# Patient Record
Sex: MALE | Race: WHITE | NOT HISPANIC OR LATINO | Employment: UNEMPLOYED | URBAN - METROPOLITAN AREA
[De-identification: names, ages, dates, MRNs, and addresses within clinical notes are randomized per-mention and may not be internally consistent; named-entity substitution may affect disease eponyms.]

---
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
## 2024-04-24 ENCOUNTER — OFFICE VISIT (OUTPATIENT)
Dept: INTERNAL MEDICINE | Age: 19
End: 2024-04-24
Payer: COMMERCIAL

## 2024-04-24 ENCOUNTER — TELEPHONE (OUTPATIENT)
Dept: INTERNAL MEDICINE | Age: 19
End: 2024-04-24

## 2024-04-24 VITALS
DIASTOLIC BLOOD PRESSURE: 90 MMHG | WEIGHT: 176.6 LBS | BODY MASS INDEX: 26.76 KG/M2 | HEIGHT: 68 IN | OXYGEN SATURATION: 98 % | HEART RATE: 58 BPM | SYSTOLIC BLOOD PRESSURE: 148 MMHG | TEMPERATURE: 98.4 F | RESPIRATION RATE: 16 BRPM

## 2024-04-24 DIAGNOSIS — Z01.89 ROUTINE LAB DRAW: ICD-10-CM

## 2024-04-24 DIAGNOSIS — Z13.6 ENCOUNTER FOR SCREENING FOR CARDIOVASCULAR DISORDERS: ICD-10-CM

## 2024-04-24 DIAGNOSIS — I10 PRIMARY HYPERTENSION: ICD-10-CM

## 2024-04-24 DIAGNOSIS — G44.52 NEW DAILY PERSISTENT HEADACHE: ICD-10-CM

## 2024-04-24 DIAGNOSIS — G44.53 THUNDERCLAP HEADACHE: Primary | ICD-10-CM

## 2024-04-24 PROBLEM — G44.209 ACUTE NON INTRACTABLE TENSION-TYPE HEADACHE: Status: ACTIVE | Noted: 2024-04-24

## 2024-04-24 PROBLEM — G44.209 ACUTE NON INTRACTABLE TENSION-TYPE HEADACHE: Status: RESOLVED | Noted: 2024-04-24 | Resolved: 2024-04-24

## 2024-04-24 LAB
25(OH)D3 SERPL-MCNC: 27.1 NG/ML (ref 30–100)
ALBUMIN SERPL-MCNC: 5.1 G/DL (ref 3.5–5.2)
ALBUMIN/GLOB SERPL: 1.9 G/DL
ALP SERPL-CCNC: 125 U/L (ref 39–117)
ALT SERPL W P-5'-P-CCNC: 23 U/L (ref 1–41)
ANION GAP SERPL CALCULATED.3IONS-SCNC: 12.4 MMOL/L (ref 5–15)
AST SERPL-CCNC: 27 U/L (ref 1–40)
BASOPHILS # BLD AUTO: 0.04 10*3/MM3 (ref 0–0.2)
BASOPHILS NFR BLD AUTO: 0.6 % (ref 0–1.5)
BILIRUB SERPL-MCNC: 0.5 MG/DL (ref 0–1.2)
BUN SERPL-MCNC: 15 MG/DL (ref 6–20)
BUN/CREAT SERPL: 13 (ref 7–25)
CALCIUM SPEC-SCNC: 10.3 MG/DL (ref 8.6–10.5)
CHLORIDE SERPL-SCNC: 101 MMOL/L (ref 98–107)
CHOLEST SERPL-MCNC: 156 MG/DL (ref 0–200)
CO2 SERPL-SCNC: 26.6 MMOL/L (ref 22–29)
CREAT SERPL-MCNC: 1.15 MG/DL (ref 0.76–1.27)
DEPRECATED RDW RBC AUTO: 38.8 FL (ref 37–54)
EGFRCR SERPLBLD CKD-EPI 2021: 94 ML/MIN/1.73
EOSINOPHIL # BLD AUTO: 0.09 10*3/MM3 (ref 0–0.4)
EOSINOPHIL NFR BLD AUTO: 1.3 % (ref 0.3–6.2)
ERYTHROCYTE [DISTWIDTH] IN BLOOD BY AUTOMATED COUNT: 13 % (ref 12.3–15.4)
FOLATE SERPL-MCNC: 19.3 NG/ML (ref 4.78–24.2)
GLOBULIN UR ELPH-MCNC: 2.7 GM/DL
GLUCOSE SERPL-MCNC: 100 MG/DL (ref 65–99)
HCT VFR BLD AUTO: 44.5 % (ref 37.5–51)
HDLC SERPL-MCNC: 50 MG/DL (ref 40–60)
HGB BLD-MCNC: 14.9 G/DL (ref 13–17.7)
IMM GRANULOCYTES # BLD AUTO: 0.01 10*3/MM3 (ref 0–0.05)
IMM GRANULOCYTES NFR BLD AUTO: 0.1 % (ref 0–0.5)
LDLC SERPL CALC-MCNC: 95 MG/DL (ref 0–100)
LDLC/HDLC SERPL: 1.9 {RATIO}
LYMPHOCYTES # BLD AUTO: 3.74 10*3/MM3 (ref 0.7–3.1)
LYMPHOCYTES NFR BLD AUTO: 52.5 % (ref 19.6–45.3)
MAGNESIUM SERPL-MCNC: 2.2 MG/DL (ref 1.7–2.2)
MCH RBC QN AUTO: 27.7 PG (ref 26.6–33)
MCHC RBC AUTO-ENTMCNC: 33.5 G/DL (ref 31.5–35.7)
MCV RBC AUTO: 82.9 FL (ref 79–97)
MONOCYTES # BLD AUTO: 0.45 10*3/MM3 (ref 0.1–0.9)
MONOCYTES NFR BLD AUTO: 6.3 % (ref 5–12)
NEUTROPHILS NFR BLD AUTO: 2.8 10*3/MM3 (ref 1.7–7)
NEUTROPHILS NFR BLD AUTO: 39.2 % (ref 42.7–76)
NRBC BLD AUTO-RTO: 0 /100 WBC (ref 0–0.2)
PLATELET # BLD AUTO: 271 10*3/MM3 (ref 140–450)
PMV BLD AUTO: 11 FL (ref 6–12)
POTASSIUM SERPL-SCNC: 3.8 MMOL/L (ref 3.5–5.2)
PROT SERPL-MCNC: 7.8 G/DL (ref 6–8.5)
RBC # BLD AUTO: 5.37 10*6/MM3 (ref 4.14–5.8)
SODIUM SERPL-SCNC: 140 MMOL/L (ref 136–145)
T3FREE SERPL-MCNC: 4.63 PG/ML (ref 2–4.4)
T4 FREE SERPL-MCNC: 1.67 NG/DL (ref 0.93–1.7)
TRIGL SERPL-MCNC: 55 MG/DL (ref 0–150)
TSH SERPL DL<=0.05 MIU/L-ACNC: 6.14 UIU/ML (ref 0.27–4.2)
VIT B12 BLD-MCNC: 686 PG/ML (ref 211–946)
VLDLC SERPL-MCNC: 11 MG/DL (ref 5–40)
WBC NRBC COR # BLD AUTO: 7.13 10*3/MM3 (ref 3.4–10.8)

## 2024-04-24 PROCEDURE — 82607 VITAMIN B-12: CPT | Performed by: INTERNAL MEDICINE

## 2024-04-24 PROCEDURE — 99204 OFFICE O/P NEW MOD 45 MIN: CPT | Performed by: INTERNAL MEDICINE

## 2024-04-24 PROCEDURE — 83735 ASSAY OF MAGNESIUM: CPT | Performed by: INTERNAL MEDICINE

## 2024-04-24 PROCEDURE — 82306 VITAMIN D 25 HYDROXY: CPT | Performed by: INTERNAL MEDICINE

## 2024-04-24 PROCEDURE — 80061 LIPID PANEL: CPT | Performed by: INTERNAL MEDICINE

## 2024-04-24 PROCEDURE — 82746 ASSAY OF FOLIC ACID SERUM: CPT | Performed by: INTERNAL MEDICINE

## 2024-04-24 PROCEDURE — 84439 ASSAY OF FREE THYROXINE: CPT | Performed by: INTERNAL MEDICINE

## 2024-04-24 PROCEDURE — 84481 FREE ASSAY (FT-3): CPT | Performed by: INTERNAL MEDICINE

## 2024-04-24 PROCEDURE — 80050 GENERAL HEALTH PANEL: CPT | Performed by: INTERNAL MEDICINE

## 2024-04-24 RX ORDER — AMLODIPINE BESYLATE 2.5 MG/1
2.5 TABLET ORAL DAILY
Qty: 30 TABLET | Refills: 2 | Status: SHIPPED | OUTPATIENT
Start: 2024-04-24

## 2024-04-24 RX ORDER — IBUPROFEN 600 MG/1
600 TABLET ORAL EVERY 8 HOURS PRN
Qty: 360 TABLET | Refills: 0 | Status: SHIPPED | OUTPATIENT
Start: 2024-04-24

## 2024-04-24 NOTE — ASSESSMENT & PLAN NOTE
Headache uncontrolled and new-now daily at frontal and temporal area for the past week-initially was at the left frontal area and occurred when he was lifting weights- 3 times a week for the past 2 weeks and out daily at the frontal and temporal area  May be associated with the amount of weights he was lifting he started lifting 600 pounds for the past 4 weeks and was doing only firing 50 pounds prior to the    Need to rule out possible aneurysm or other pathology    Plan-feel imaging studies needed we will do a CT scan of the head  Advised patient to cut back on the amount of weights he is lifting less than 5 pounds preferably and if headache recurs then stop  Use ibuprofen can take up to 600 mg with food as needed for headaches counseled on side effects of chronic use specially bleeding and gastritis  Follow-up as scheduled-sooner if symptoms worsen like blurred vision worsening headache-elevated blood pressure    Will order CT of the head with contrast and CT angiogram of the head with contrast    Addendum April 24, 2024  Initially ordered CT of the head without contrast which got approved and tried to change this to CT of head WITH contrast and also get a CTA of the head WITH contrast but insurance currently is denying this.

## 2024-04-24 NOTE — ASSESSMENT & PLAN NOTE
Blood pressure uncontrolled-BP at clinic equals 148/90 on my exam-146/90 in January 2024 at ER, and in October 2021 BP equals 138/71.    Assessment-hypertension uncontrolled    Plan-discussed hypertension and its complications i.e. stroke MI chronic kidney disease peripheral vascular disease  Discussed lifestyle changes patient is a non-smoker-and exercises regularly but diet is poor.  He was eating out 1-2 times per week at fast foods last month states has decreased that recently.  Start amlodipine 2.5 mg 1 daily  Goal blood pressure is less than 130/80 ideally is 120/80  Encourage patient to check blood pressure at home or at clinic twice a day 3-4 times a week and record  Do labs today  Follow-up in 3 to 4 weeks

## 2024-04-24 NOTE — PATIENT INSTRUCTIONS
Do CT of head with contrast and CTA of head with contrast for Thunderclap headache  Labs today  Start amlodipine 2.5 mg daily-  Ibuprofen 600 mg with food for HA prn-SE discussed -bleeding , gastritis    Avoid any weight lifting or exercise until CT scan of the head and CTA of the head have been done

## 2024-04-24 NOTE — PROGRESS NOTES
CHIEF COMPLAINT  Adrian Harris presents to [facility] to Establish Care (Headaches-Started about two weeks ago at the gym when he was lifting weights and then started getting them everyday. Always on the left side of his head and goes back to his neck. No blurred vision. Some nausea. No light sensitivity. )     HPI    18-year-old patient here to establish care-    Main concern is per above he was lifting weights 2 weeks ago-and started getting headache on the left side--gets HA 1st 10 min when in gym-last episode of left frontal area HA was 3 days ago -now has had daily HA at bilateral temple area -last one was last night  lasted 30 min-3/10 then -now none at present  Not assd with loud noises or bright light-   Works at [employer] at night 5:45 pm to 4:15 am- sleeps for 8-9 hrs    2 weeks ago Was doing leg presses at 600 lbs-and HA started -start first 10 min after in gym ([employer]) going 3 days a week(WThFr)  HA lasts from 20 min-to an hour  Started  bench lifting 600 lbs 4 weeks ago (550 lbs prior)       Elev BP-- 138/71  on 10/2021-eats out 1-2x/week last month-    FH-denies CAD in parents though dad had renal failure at 19 yo-pt unclear as to dx    SH- no cig- no drugs or ETOH  Works at [employer] at night 5:45 pm to 4:15 am- sleeps for 8-9 hrs   Works out W,Th and Fri one hr     Past History:  Allergies: Patient has no known allergies.   Medical History: has no past medical history on file.   Surgical History: has a past surgical history that includes Tonsillectomy.   Family History: family history is not on file.   Social History: reports that he has never smoked. He has never been exposed to tobacco smoke. He has never used smokeless tobacco. He reports that he does not drink alcohol.  Social History     Social History Narrative    Not on file         Current Outpatient Medications:     amLODIPine (NORVASC) 2.5 MG tablet, Take 1 tablet by mouth Daily. Goal BP<130/80, Disp: 30  "tablet, Rfl: 2    ibuprofen (ADVIL,MOTRIN) 600 MG tablet, Take 1 tablet by mouth Every 8 (Eight) Hours As Needed for Mild Pain. With food, Disp: 360 tablet, Rfl: 0     OBJECTIVE  Vital Signs  Vitals:    04/24/24 0758 04/24/24 0831   BP: 150/90 148/90   BP Location: Left arm Right arm   Patient Position: Sitting Sitting   Cuff Size: Large Adult Adult   Pulse: 58    Resp: 16    Temp: 98.4 °F (36.9 °C)    TempSrc: Temporal    SpO2: 98%    Weight: 80.1 kg (176 lb 9.6 oz)    Height: 172.7 cm (68\")       Body mass index is 26.85 kg/m².      Physical Exam  Vitals and nursing note reviewed.   Constitutional:       Appearance: Normal appearance.   HENT:      Head: Normocephalic and atraumatic.      Right Ear: Tympanic membrane normal.      Left Ear: Tympanic membrane normal.      Nose: Nose normal. No rhinorrhea.      Mouth/Throat:      Pharynx: No posterior oropharyngeal erythema.      Comments: No sinus tenderness  Eyes:      Extraocular Movements: Extraocular movements intact.      Pupils: Pupils are equal, round, and reactive to light.   Cardiovascular:      Rate and Rhythm: Normal rate and regular rhythm.   Pulmonary:      Effort: Pulmonary effort is normal.      Breath sounds: Normal breath sounds.   Abdominal:      General: Abdomen is flat.      Palpations: Abdomen is soft.   Musculoskeletal:      Cervical back: Normal range of motion and neck supple.   Skin:     General: Skin is warm and dry.   Neurological:      General: No focal deficit present.      Mental Status: He is alert and oriented to person, place, and time.   Psychiatric:         Mood and Affect: Mood normal.         Behavior: Behavior normal.         RESULTS REVIEW  No results found for: \"PROBNP\", \"BNP\"  CMP          4/24/2024    09:11   CMP   Glucose 100    BUN 15    Creatinine 1.15    EGFR 94.0    Sodium 140    Potassium 3.8    Chloride 101    Calcium 10.3    Total Protein 7.8    Albumin 5.1    Globulin 2.7    Total Bilirubin 0.5    Alkaline " Phosphatase 125    AST (SGOT) 27    ALT (SGPT) 23    Albumin/Globulin Ratio 1.9    BUN/Creatinine Ratio 13.0    Anion Gap 12.4         Lipid Panel          4/24/2024    09:11   Lipid Panel   Total Cholesterol 156    Triglycerides 55    HDL Cholesterol 50    VLDL Cholesterol 11    LDL Cholesterol  95    LDL/HDL Ratio 1.90       Lab Results   Component Value Date    TSH 6.140 (H) 04/24/2024      Lab Results   Component Value Date    FREET4 1.67 04/24/2024            No Images in the past 120 days found..              ASSESSMENT & PLAN  Diagnoses and all orders for this visit:    1. Thunderclap headache (Primary)  Assessment & Plan:    Headache uncontrolled and new-now daily at frontal and temporal area for the past week-initially was at the left frontal area and occurred when he was lifting weights- 3 times a week for the past 2 weeks and out daily at the frontal and temporal area  May be associated with the amount of weights he was lifting he started lifting 600 pounds for the past 4 weeks and was doing only firing 50 pounds prior to the    Need to rule out possible aneurysm or other pathology    Plan-feel imaging studies needed we will do a CT scan of the head  Advised patient to cut back on the amount of weights he is lifting less than 5 pounds preferably and if headache recurs then stop  Use ibuprofen can take up to 600 mg with food as needed for headaches counseled on side effects of chronic use specially bleeding and gastritis  Follow-up as scheduled-sooner if symptoms worsen like blurred vision worsening headache-elevated blood pressure    Will order CT of the head with contrast and CT angiogram of the head with contrast    Addendum April 24, 2024  Initially ordered CT of the head without contrast which got approved and tried to change this to CT of head WITH contrast and also get a CTA of the head WITH contrast but insurance currently is denying this.                        Orders:  -     CT Angiogram Head  With Contrast; Future  -     CT Head With Contrast; Future    2. New daily persistent headache  -     Cancel: CT Head Without Contrast; Future  -     ibuprofen (ADVIL,MOTRIN) 600 MG tablet; Take 1 tablet by mouth Every 8 (Eight) Hours As Needed for Mild Pain. With food  Dispense: 360 tablet; Refill: 0  -     CT Angiogram Head With Contrast; Future  -     CT Head With Contrast; Future    3. Primary hypertension  Assessment & Plan:  Blood pressure uncontrolled-BP at clinic equals 148/90 on my exam-146/90 in January 2024 at ER, and in October 2021 BP equals 138/71.    Assessment-hypertension uncontrolled    Plan-discussed hypertension and its complications i.e. stroke MI chronic kidney disease peripheral vascular disease  Discussed lifestyle changes patient is a non-smoker-and exercises regularly but diet is poor.  He was eating out 1-2 times per week at fast foods last month states has decreased that recently.  Start amlodipine 2.5 mg 1 daily  Goal blood pressure is less than 130/80 ideally is 120/80  Encourage patient to check blood pressure at home or at clinic twice a day 3-4 times a week and record  Do labs today  Follow-up in 3 to 4 weeks    Orders:  -     amLODIPine (NORVASC) 2.5 MG tablet; Take 1 tablet by mouth Daily. Goal BP<130/80  Dispense: 30 tablet; Refill: 2  -     Comprehensive Metabolic Panel; Future  -     Lipid Panel; Future  -     TSH+Free T4; Future  -     T3, Free; Future  -     Magnesium; Future  -     Vitamin D,25-Hydroxy; Future  -     CBC & Differential; Future  -     MicroAlbumin, Urine, Random - Urine, Clean Catch; Future  -     Vitamin B12; Future  -     Folate; Future  -     Cancel: CT Head Without Contrast; Future  -     ibuprofen (ADVIL,MOTRIN) 600 MG tablet; Take 1 tablet by mouth Every 8 (Eight) Hours As Needed for Mild Pain. With food  Dispense: 360 tablet; Refill: 0  -     Folate  -     Vitamin B12  -     CBC & Differential  -     Vitamin D,25-Hydroxy  -     Magnesium  -     T3,  Free  -     TSH+Free T4  -     Lipid Panel  -     Comprehensive Metabolic Panel    4. Encounter for screening for cardiovascular disorders  -     Comprehensive Metabolic Panel; Future  -     Lipid Panel; Future  -     TSH+Free T4; Future  -     T3, Free; Future  -     Magnesium; Future  -     Vitamin D,25-Hydroxy; Future  -     CBC & Differential; Future  -     MicroAlbumin, Urine, Random - Urine, Clean Catch; Future  -     Vitamin B12; Future  -     Folate; Future  -     Folate  -     Vitamin B12  -     CBC & Differential  -     Vitamin D,25-Hydroxy  -     Magnesium  -     T3, Free  -     TSH+Free T4  -     Lipid Panel  -     Comprehensive Metabolic Panel    5. Routine lab draw  -     Comprehensive Metabolic Panel; Future  -     Lipid Panel; Future  -     TSH+Free T4; Future  -     T3, Free; Future  -     Magnesium; Future  -     Vitamin D,25-Hydroxy; Future  -     CBC & Differential; Future  -     MicroAlbumin, Urine, Random - Urine, Clean Catch; Future  -     Vitamin B12; Future  -     Folate; Future  -     Folate  -     Vitamin B12  -     CBC & Differential  -     Vitamin D,25-Hydroxy  -     Magnesium  -     T3, Free  -     TSH+Free T4  -     Lipid Panel  -     Comprehensive Metabolic Panel         Pediatric BMI = 84 %ile (Z= 1.01) based on CDC (Boys, 2-20 Years) BMI-for-age based on BMI available as of 4/24/2024.. BMI is >= 25 and <30. (Overweight) The following options were offered after discussion;: weight loss educational material (shared in after visit summary), exercise counseling/recommendations, and nutrition counseling/recommendations,       Patient Instructions   Do CT of head with contrast and CTA of head with contrast for Thunderclap headache  Labs today  Start amlodipine 2.5 mg daily-  Ibuprofen 600 mg with food for HA prn-SE discussed -bleeding , gastritis    Avoid any weight lifting or exercise until CT scan of the head and CTA of the head have been done     FOLLOW UP  Return in about 27 days  (around 5/21/2024) for Recheck, Annual physical.    Patient was given instructions and counseling regarding his condition or for health maintenance advice. Please see specific information pulled into the AVS if appropriate.

## 2024-04-30 ENCOUNTER — PATIENT ROUNDING (BHMG ONLY) (OUTPATIENT)
Dept: INTERNAL MEDICINE | Age: 19
End: 2024-04-30
Payer: COMMERCIAL

## 2024-05-09 ENCOUNTER — OFFICE VISIT (OUTPATIENT)
Dept: INTERNAL MEDICINE | Age: 19
End: 2024-05-09
Payer: COMMERCIAL

## 2024-05-09 ENCOUNTER — HOSPITAL ENCOUNTER (OUTPATIENT)
Dept: MRI IMAGING | Facility: HOSPITAL | Age: 19
Discharge: HOME | End: 2024-05-09
Admitting: NURSE PRACTITIONER
Payer: COMMERCIAL

## 2024-05-09 VITALS
HEART RATE: 86 BPM | RESPIRATION RATE: 18 BRPM | DIASTOLIC BLOOD PRESSURE: 82 MMHG | HEIGHT: 68 IN | OXYGEN SATURATION: 98 % | SYSTOLIC BLOOD PRESSURE: 136 MMHG | TEMPERATURE: 98.2 F | WEIGHT: 170.6 LBS | BODY MASS INDEX: 25.85 KG/M2

## 2024-05-09 DIAGNOSIS — R93.89 ABNORMAL COMPUTED TOMOGRAPHY ANGIOGRAPHY (CTA): ICD-10-CM

## 2024-05-09 DIAGNOSIS — G44.52 NEW DAILY PERSISTENT HEADACHE: ICD-10-CM

## 2024-05-09 DIAGNOSIS — G44.52 NEW DAILY PERSISTENT HEADACHE: Primary | ICD-10-CM

## 2024-05-09 DIAGNOSIS — R90.89 ABNORMAL BRAIN MRI: ICD-10-CM

## 2024-05-09 PROCEDURE — 70551 MRI BRAIN STEM W/O DYE: CPT

## 2024-05-09 PROCEDURE — 99213 OFFICE O/P EST LOW 20 MIN: CPT | Performed by: NURSE PRACTITIONER

## 2024-05-10 ENCOUNTER — TELEPHONE (OUTPATIENT)
Dept: INTERNAL MEDICINE | Age: 19
End: 2024-05-10
Payer: COMMERCIAL

## 2024-05-16 ENCOUNTER — OFFICE VISIT (OUTPATIENT)
Dept: INTERNAL MEDICINE | Age: 19
End: 2024-05-16
Payer: COMMERCIAL

## 2024-05-16 VITALS
WEIGHT: 174.6 LBS | SYSTOLIC BLOOD PRESSURE: 136 MMHG | BODY MASS INDEX: 26.46 KG/M2 | DIASTOLIC BLOOD PRESSURE: 80 MMHG | TEMPERATURE: 98.7 F | HEIGHT: 68 IN | OXYGEN SATURATION: 98 % | RESPIRATION RATE: 18 BRPM | HEART RATE: 84 BPM

## 2024-05-16 DIAGNOSIS — G44.52 NEW DAILY PERSISTENT HEADACHE: ICD-10-CM

## 2024-05-16 DIAGNOSIS — R90.89 ABNORMAL BRAIN MRI: Primary | ICD-10-CM

## 2024-05-16 PROCEDURE — 99212 OFFICE O/P EST SF 10 MIN: CPT | Performed by: NURSE PRACTITIONER

## 2024-05-16 NOTE — PROGRESS NOTES
"Chief Complaint  Headache (18 year old male here today for a follow up on headaches. States he has still been having headaches daily. /He had an MRI to follow up on today as well. )    Subjective      Adrian Harris is a 18 year old male that presents to [facility] for f/u after MRI. Headaches are still persistent. He is not taking any medications. States that headaches are always located on the left side and typically go away pretty quick. He denies any associated symptoms. He has been off of work since last week. Has not been lifting any weights as instructed.  Scheduled to see neurosurgery tomorrow.           History of Present Illness    Current Outpatient Medications   Medication Instructions    amLODIPine (NORVASC) 2.5 mg, Oral, Daily, Goal BP<130/80    ibuprofen (ADVIL,MOTRIN) 600 mg, Oral, Every 8 Hours PRN, With food       The following portions of the patient's history were reviewed and updated as appropriate: allergies, current medications, past family history, past medical history, past social history, past surgical history, and problem list.    Objective   Vital Signs:   /80 (BP Location: Left arm, Patient Position: Sitting)   Pulse 84   Temp 98.7 °F (37.1 °C) (Temporal)   Resp 18   Ht 172.7 cm (68\")   Wt 79.2 kg (174 lb 9.6 oz)   SpO2 98%   BMI 26.55 kg/m²     Wt Readings from Last 3 Encounters:   05/16/24 79.2 kg (174 lb 9.6 oz) (75%, Z= 0.68)*   05/09/24 77.4 kg (170 lb 9.6 oz) (71%, Z= 0.55)*   04/24/24 80.1 kg (176 lb 9.6 oz) (77%, Z= 0.75)*     * Growth percentiles are based on CDC (Boys, 2-20 Years) data.     BP Readings from Last 3 Encounters:   05/16/24 136/80   05/09/24 136/82   04/24/24 148/90     Physical Exam  Vitals and nursing note reviewed.   Constitutional:       Appearance: Normal appearance.   HENT:      Head: Normocephalic and atraumatic.   Pulmonary:      Effort: Pulmonary effort is normal.   Skin:     General: Skin is warm and " dry.   Neurological:      Mental Status: He is alert and oriented to person, place, and time.   Psychiatric:         Mood and Affect: Mood normal.         Behavior: Behavior normal.          Result Review :  The following data was reviewed by: NIDHI Ryan on 05/16/2024:      Common labs          4/24/2024    09:11   Common Labs   Glucose 100    BUN 15    Creatinine 1.15    Sodium 140    Potassium 3.8    Chloride 101    Calcium 10.3    Albumin 5.1    Total Bilirubin 0.5    Alkaline Phosphatase 125    AST (SGOT) 27    ALT (SGPT) 23    WBC 7.13    Hemoglobin 14.9    Hematocrit 44.5    Platelets 271    Total Cholesterol 156    Triglycerides 55    HDL Cholesterol 50    LDL Cholesterol  95        Lab Results (last 72 hours)       ** No results found for the last 72 hours. **             MRI Brain Without Contrast    Result Date: 5/9/2024  Impression: 1.  0.8 cm rounded extracts focus of T1 high signal in the lateral left temporal region. Query the possibility of a small partially thrombosed cortical vein. Start number 2.  No acute infarction    Electronically Signed By-Kyle Gomez MD On:5/9/2024 7:25 PM        Lab Results   Component Value Date    SARSANTIGEN Not Detected 01/28/2024    COVID19 Not Detected 10/30/2021    RAPFLUA Negative 10/30/2021    RAPFLUB Negative 10/30/2021    FLUAAG Not Detected 01/28/2024    FLUBAG Not Detected 01/28/2024    RAPSCRN Negative 01/28/2024       Procedures        Assessment and Plan   Diagnoses and all orders for this visit:    1. Abnormal brain MRI (Primary)    2. New daily persistent headache          Neurosurgery appt tomorrow 5/17/24. Work restrictions paperwork completed.      There are no discontinued medications.       Follow Up   No follow-ups on file.  Patient was given instructions and counseling regarding his condition or for health maintenance advice. Please see specific information pulled into the AVS if appropriate.       Tabitha Monique  NIDHI  05/17/24  08:13 EDT

## 2024-05-20 ENCOUNTER — TELEPHONE (OUTPATIENT)
Dept: INTERNAL MEDICINE | Age: 19
End: 2024-05-20
Payer: COMMERCIAL

## 2024-05-20 NOTE — TELEPHONE ENCOUNTER
Caller: Adrian Harris    Relationship: Self    Best call back number: [phone]       How would you like to receive the form or medical records (pick-up, mail, fax[facility]      Timeframe paperwork needed: ASAP     Additional notes:     THE PATIENT SAID THAT DR TOURE SAID HE CAN GO BACK TO WORK IF HE FEELS COMFORTABLE TO DO SO. THE PATIENT SAID HE TO DR TOURE THAT HE WANTED TO BE OFF WORK BUT HAS  CHANGED HIS MIND DUE TO MONEY ISSUES. HE IS WANTING  TO KNOW IF PCP NAT WOULD WRITE THE NOTE FOR HIM TO RETURN TO WORK      PLEASE ADVISE PATIENT

## 2024-07-24 ENCOUNTER — OFFICE VISIT (OUTPATIENT)
Dept: INTERNAL MEDICINE | Age: 19
End: 2024-07-24
Payer: COMMERCIAL

## 2024-07-24 VITALS
BODY MASS INDEX: 25.52 KG/M2 | WEIGHT: 168.4 LBS | TEMPERATURE: 96.4 F | HEIGHT: 68 IN | OXYGEN SATURATION: 98 % | DIASTOLIC BLOOD PRESSURE: 80 MMHG | HEART RATE: 67 BPM | SYSTOLIC BLOOD PRESSURE: 140 MMHG

## 2024-07-24 DIAGNOSIS — I10 PRIMARY HYPERTENSION: ICD-10-CM

## 2024-07-24 DIAGNOSIS — G44.229 CHRONIC TENSION-TYPE HEADACHE, NOT INTRACTABLE: ICD-10-CM

## 2024-07-24 DIAGNOSIS — Z00.00 ROUTINE HISTORY AND PHYSICAL EXAMINATION OF ADULT: Primary | ICD-10-CM

## 2024-07-24 DIAGNOSIS — Q28.2 AVM (ARTERIOVENOUS MALFORMATION) BRAIN: ICD-10-CM

## 2024-07-24 PROCEDURE — 99395 PREV VISIT EST AGE 18-39: CPT | Performed by: INTERNAL MEDICINE

## 2024-07-24 RX ORDER — AMLODIPINE BESYLATE 2.5 MG/1
2.5 TABLET ORAL DAILY
Qty: 90 TABLET | Refills: 1 | Status: SHIPPED | OUTPATIENT
Start: 2024-07-24

## 2024-07-24 NOTE — PATIENT INSTRUCTIONS
Stay on amlodipine 2.5 mg daily  Goal BP <130/80  Follow low salt diet   Keep appointments with neurosurgeon tomorrow and neurologist Dr. Gaines  Follow-up in 6 weeks and bring blood pressure log

## 2024-07-24 NOTE — ASSESSMENT & PLAN NOTE
Blood pressure = 122/72 at clinic, on repeat exam it was 140/80  Assessment-hypertension uncontrolled    Plan-asked patient to monitor blood pressure at home  Goal blood pressures less than 130/80  Follow a low-salt diet  Continue amlodipine 2.5 mg daily

## 2024-07-24 NOTE — ASSESSMENT & PLAN NOTE
Ages 19 to 39 Counseling/Anticipatory Guidance Discussed: nutrition, physical activity, healthy weight, injury prevention, misuse of tobacco, alcohol and drugs, sexual behavior and STDs, dental health, mental health, immunizations, and use of seatbelt

## 2024-07-24 NOTE — PROGRESS NOTES
CHIEF COMPLAINT  Adrian Harris presents to [facility] for follow-up of Annual Exam (Patient has been seeing Dr. Paiz  Headaches have been a couple times a week patient has angioram tomorrow).    HPI    19-year-old patient here for physical    Chronic tension-type headache -referred to Dr. Gaines (8/2024)  by  Dr. Paiz at [facility]    Possible AV malformation seeing Dr. Paiz-MRI scan with potential catheter angiogram in the near future    Doing Mixed martial arts past month    C/o pressure type HA now every other day now-min to hours-    HTN-130/82-at home    Older notes    Patient Instructions 4/24/2024   Do CT of head with contrast and CTA of head with contrast for [place] headache  Labs today  Start amlodipine 2.5 mg daily-  Ibuprofen 600 mg with food for HA prn-SE discussed -bleeding , gastritis     Avoid any weight lifting or exercise until CT scan of the head and CTA of the head have been done   4/24/2024     patient here to establish care-     Main concern is per above he was lifting weights 2 weeks ago-and started getting headache on the left side--gets HA 1st 10 min when in gym-last episode of left frontal area HA was 3 days ago -now has had daily HA at bilateral temple area -last one was last night  lasted 30 min-3/10 then -now none at present  Not assd with loud noises or bright light-   Works at [employer] at night 5:45 pm to 4:15 am- sleeps for 8-9 hrs     2 weeks ago Was doing leg presses at 600 lbs-and HA started -start first 10 min after in gym ([employer]) going 3 days a week(WThFr)  HA lasts from 20 min-to an hour  Started  bench lifting 600 lbs 4 weeks ago (550 lbs prior)         Elev BP-- 138/71  on 10/2021-eats out 1-2x/week last month-     FH-denies CAD in parents though dad had renal failure at 19 yo-pt unclear as to dx     SH- no cig- no drugs or ETOH  Works at [employer] at night 5:45 pm to 4:15 am- sleeps for 8-9 hrs   Works out W,Th and Fri one hr        Current  "Outpatient Medications:     amLODIPine (NORVASC) 2.5 MG tablet, Take 1 tablet by mouth Daily. Goal BP<130/80, Disp: 90 tablet, Rfl: 1    ibuprofen (ADVIL,MOTRIN) 600 MG tablet, Take 1 tablet by mouth Every 8 (Eight) Hours As Needed for Mild Pain. With food, Disp: 360 tablet, Rfl: 0   PFSH reviewed.      OBJECTIVE  Vital Signs  Vitals:    07/24/24 1454 07/24/24 1612   BP: 122/72 140/80   BP Location: Left arm Left arm   Patient Position: Sitting Sitting   Cuff Size: Small Adult Adult   Pulse: 67    Temp: 96.4 °F (35.8 °C)    TempSrc: Temporal    SpO2: 98%    Weight: 76.4 kg (168 lb 6.4 oz)    Height: 172.7 cm (68\")       Body mass index is 25.61 kg/m².    Physical Exam  Vitals and nursing note reviewed.   Constitutional:       Appearance: Normal appearance.   HENT:      Head: Normocephalic and atraumatic.      Nose: Nose normal.   Eyes:      Extraocular Movements: Extraocular movements intact.      Pupils: Pupils are equal, round, and reactive to light.   Cardiovascular:      Rate and Rhythm: Normal rate and regular rhythm.   Pulmonary:      Effort: Pulmonary effort is normal.      Breath sounds: Normal breath sounds.   Abdominal:      General: Abdomen is flat.      Palpations: Abdomen is soft.   Musculoskeletal:      Cervical back: Normal range of motion and neck supple.   Skin:     General: Skin is warm and dry.   Neurological:      General: No focal deficit present.      Mental Status: He is alert and oriented to person, place, and time.   Psychiatric:         Mood and Affect: Mood normal.         Behavior: Behavior normal.          RESULTS REVIEW  No results found for: \"PROBNP\", \"BNP\"  CMP          4/24/2024    09:11   CMP   Glucose 100    BUN 15    Creatinine 1.15    EGFR 94.0    Sodium 140    Potassium 3.8    Chloride 101    Calcium 10.3    Total Protein 7.8    Albumin 5.1    Globulin 2.7    Total Bilirubin 0.5    Alkaline Phosphatase 125    AST (SGOT) 27    ALT (SGPT) 23    Albumin/Globulin Ratio 1.9  "   BUN/Creatinine Ratio 13.0    Anion Gap 12.4      CBC w/diff          4/24/2024    09:11 7/10/2024    15:10   CBC w/Diff   WBC 7.13  5.29       RBC 5.37  5.24       Hemoglobin 14.9  14.6       Hematocrit 44.5  44.7       MCV 82.9  85.3       MCH 27.7  27.9       MCHC 33.5  32.7       RDW 13.0  12.8       Platelets 271  230       Neutrophil Rel % 39.2  45.3       Immature Granulocyte Rel % 0.1  0.2       Lymphocyte Rel % 52.5  44.6       Monocyte Rel % 6.3  7.8       Eosinophil Rel % 1.3  1.3       Basophil Rel % 0.6  0.8          Details          This result is from an external source.              Lipid Panel          4/24/2024    09:11   Lipid Panel   Total Cholesterol 156    Triglycerides 55    HDL Cholesterol 50    VLDL Cholesterol 11    LDL Cholesterol  95    LDL/HDL Ratio 1.90       Lab Results   Component Value Date    TSH 6.140 (H) 04/24/2024      Lab Results   Component Value Date    FREET4 1.67 04/24/2024         Lab Results   Component Value Date    NKGFBLVR79 686 04/24/2024    OLAT98IK 27.1 (L) 04/24/2024    MG 2.2 04/24/2024        MRI Brain Without Contrast    Result Date: 5/9/2024  Impression: 1.  0.8 cm rounded extracts focus of T1 high signal in the lateral left temporal region. Query the possibility of a small partially thrombosed cortical vein. Start number 2.  No acute infarction    Electronically Signed By-Kyle Gomez MD On:5/9/2024 7:25 PM                 ASSESSMENT & PLAN  Diagnoses and all orders for this visit:    1. Routine history and physical examination of adult (Primary)  Assessment & Plan:  Ages 19 to 39 Counseling/Anticipatory Guidance Discussed: nutrition, physical activity, healthy weight, injury prevention, misuse of tobacco, alcohol and drugs, sexual behavior and STDs, dental health, mental health, immunizations, and use of seatbelt    Orders:  -     amLODIPine (NORVASC) 2.5 MG tablet; Take 1 tablet by mouth Daily. Goal BP<130/80  Dispense: 90 tablet; Refill: 1    2. Primary  hypertension  Assessment & Plan:  Blood pressure = 122/72 at clinic, on repeat exam it was 140/80  Assessment-hypertension uncontrolled    Plan-asked patient to monitor blood pressure at home  Goal blood pressures less than 130/80  Follow a low-salt diet  Continue amlodipine 2.5 mg daily    Orders:  -     amLODIPine (NORVASC) 2.5 MG tablet; Take 1 tablet by mouth Daily. Goal BP<130/80  Dispense: 90 tablet; Refill: 1    3. Chronic tension-type headache, not intractable  Comments:  Better since he is not lifting weights has appointment with Dr. Gaines neurologist in a few weeks    4. AVM (arteriovenous malformation) brain  Comments:  For arteriogram tomorrow keep appoint with neurosurgeon Dr. Paiz tomorrtram              Patient Instructions   Stay on amlodipine 2.5 mg daily  Goal BP <130/80  Follow low salt diet   Keep appointments with neurosurgeon tomorrow and neurologist Dr. Gaines  Follow-up in 6 weeks and bring blood pressure log         FOLLOW UP  Return in about 6 weeks (around 9/4/2024) for Recheck. BP and BP log    Patient was given instructions and counseling regarding his condition or for health maintenance advice. Please see specific information pulled into the AVS if appropriate.

## 2024-09-06 ENCOUNTER — OFFICE VISIT (OUTPATIENT)
Dept: INTERNAL MEDICINE | Age: 19
End: 2024-09-06
Payer: COMMERCIAL

## 2024-09-06 VITALS
HEIGHT: 68 IN | HEART RATE: 78 BPM | BODY MASS INDEX: 27.13 KG/M2 | OXYGEN SATURATION: 97 % | DIASTOLIC BLOOD PRESSURE: 80 MMHG | WEIGHT: 179 LBS | SYSTOLIC BLOOD PRESSURE: 128 MMHG | TEMPERATURE: 97.6 F

## 2024-09-06 DIAGNOSIS — G44.84 EXERTIONAL HEADACHE: ICD-10-CM

## 2024-09-06 DIAGNOSIS — I10 PRIMARY HYPERTENSION: Primary | ICD-10-CM

## 2024-09-06 DIAGNOSIS — Q27.9 VENOUS ANOMALY: ICD-10-CM

## 2024-09-06 PROBLEM — Q28.2 AVM (ARTERIOVENOUS MALFORMATION) BRAIN: Status: RESOLVED | Noted: 2024-07-24 | Resolved: 2024-09-06

## 2024-09-06 PROCEDURE — 99214 OFFICE O/P EST MOD 30 MIN: CPT | Performed by: INTERNAL MEDICINE

## 2024-09-06 RX ORDER — AMITRIPTYLINE HYDROCHLORIDE 10 MG/1
TABLET ORAL
Qty: 60 TABLET | Refills: 1 | Status: SHIPPED | OUTPATIENT
Start: 2024-09-06

## 2024-09-06 NOTE — ASSESSMENT & PLAN NOTE
No AVM on angiogram-recommends Conservative every 5 to 6-year MRI and MRA evaluation just to ensure no new changes and to follow-up with neurology for exertional headaches.

## 2024-09-06 NOTE — PATIENT INSTRUCTIONS
S/p angiogram no AVM per Dr. Escobar records reviewed recommends MRI/MRA every 5 to 6 years to monitor for any new changes     reschedule appt with Shukri Ventura,  -patient missed appointment  [address] [facility] [address]   [place] [address]   Phone: [phone]   Fax: [phone]     Also sent a referral to Dr. Ganies/neurology    Try amitriptyline 10 mg 1 tablet at night if still with headaches can take 2 tablets regularly at night  Follow-up in 2 months consider Topamax in future or other alternatives

## 2024-09-06 NOTE — PROGRESS NOTES
"CHIEF COMPLAINT  Adrian Harris presents to [facility] for follow-up of Follow-up (6 week follow up on bp .  ) and Hypertension.    HPI  19-year-old patient here for follow-up of blood pressure/headaches    Gets HA-  2x/week occ ibuprofen 600 mg helps-  Sleep >6 hrs /night  BP-forgets to take his medicines at least twice a week    PMFSH -reviewed  ROS-HA-      Current Outpatient Medications:     amLODIPine (NORVASC) 2.5 MG tablet, Take 1 tablet by mouth Daily. Goal BP<130/80, Disp: 90 tablet, Rfl: 1    ibuprofen (ADVIL,MOTRIN) 600 MG tablet, Take 1 tablet by mouth Every 8 (Eight) Hours As Needed for Mild Pain. With food, Disp: 360 tablet, Rfl: 0    amitriptyline (ELAVIL) 10 MG tablet, 1-2 tabs at hs for sleep, Disp: 60 tablet, Rfl: 1   PFSH reviewed.      OBJECTIVE  Vital Signs  Vitals:    09/06/24 1451 09/06/24 1542   BP: 124/86 128/80   BP Location: Left arm Left arm   Patient Position: Sitting Sitting   Cuff Size: Small Adult Adult   Pulse: 78    Temp: 97.6 °F (36.4 °C)    TempSrc: Temporal    SpO2: 97%    Weight: 81.2 kg (179 lb)    Height: 172.7 cm (68\")       Body mass index is 27.22 kg/m².    Physical Exam  Vitals and nursing note reviewed.   Constitutional:       Appearance: Normal appearance.   HENT:      Head: Normocephalic and atraumatic.      Nose: Nose normal.   Eyes:      Extraocular Movements: Extraocular movements intact.      Pupils: Pupils are equal, round, and reactive to light.   Cardiovascular:      Rate and Rhythm: Normal rate and regular rhythm.   Pulmonary:      Effort: Pulmonary effort is normal.      Breath sounds: Normal breath sounds.   Abdominal:      General: Abdomen is flat.      Palpations: Abdomen is soft.   Musculoskeletal:      Cervical back: Normal range of motion and neck supple.   Skin:     General: Skin is warm and dry.   Neurological:      General: No focal deficit present.      Mental Status: He is alert and oriented to person, place, " "and time.   Psychiatric:         Mood and Affect: Mood normal.         Behavior: Behavior normal.          RESULTS REVIEW  No results found for: \"PROBNP\", \"BNP\"  CMP          4/24/2024    09:11   CMP   Glucose 100    BUN 15    Creatinine 1.15    EGFR 94.0    Sodium 140    Potassium 3.8    Chloride 101    Calcium 10.3    Total Protein 7.8    Albumin 5.1    Globulin 2.7    Total Bilirubin 0.5    Alkaline Phosphatase 125    AST (SGOT) 27    ALT (SGPT) 23    Albumin/Globulin Ratio 1.9    BUN/Creatinine Ratio 13.0    Anion Gap 12.4      CBC w/diff          4/24/2024    09:11 7/10/2024    15:10   CBC w/Diff   WBC 7.13  5.29       RBC 5.37  5.24       Hemoglobin 14.9  14.6       Hematocrit 44.5  44.7       MCV 82.9  85.3       MCH 27.7  27.9       MCHC 33.5  32.7       RDW 13.0  12.8       Platelets 271  230       Neutrophil Rel % 39.2  45.3       Immature Granulocyte Rel % 0.1  0.2       Lymphocyte Rel % 52.5  44.6       Monocyte Rel % 6.3  7.8       Eosinophil Rel % 1.3  1.3       Basophil Rel % 0.6  0.8          Details          This result is from an external source.              Lipid Panel          4/24/2024    09:11   Lipid Panel   Total Cholesterol 156    Triglycerides 55    HDL Cholesterol 50    VLDL Cholesterol 11    LDL Cholesterol  95    LDL/HDL Ratio 1.90       Lab Results   Component Value Date    TSH 6.140 (H) 04/24/2024      Lab Results   Component Value Date    FREET4 1.67 04/24/2024         Lab Results   Component Value Date    QPHEMFZY36 686 04/24/2024    AEIM81IV 27.1 (L) 04/24/2024    MG 2.2 04/24/2024        MRI Brain Without Contrast    Result Date: 5/9/2024  Impression: 1.  0.8 cm rounded extracts focus of T1 high signal in the lateral left temporal region. Query the possibility of a small partially thrombosed cortical vein. Start number 2.  No acute infarction    Electronically Signed By-Kyle Gomez MD On:5/9/2024 7:25 PM                 ASSESSMENT & PLAN  Diagnoses and all orders for this " visit:    1. Primary hypertension (Primary)  Comments:   [id number] 2.5 mg - 2x/week-has cut back on eating out--take amlodipine 2.5 mg daily-goal /80 average    2. Exertional headache  -     Ambulatory Referral to Neurology  -     amitriptyline (ELAVIL) 10 MG tablet; 1-2 tabs at hs for sleep  Dispense: 60 tablet; Refill: 1    3. Venous anomaly  Overview:  Status post angiogram by Dr. Paiz July 2024  PATHOLOGY AND SUMMARY:   There is no evidence of pathologic, high flow evidence of AVM nidus, dural AV fistula. There is a venous varix that feels exactly in phase with venous structures. Low flow anatomic variant of developmental venous anomaly.  No further angiography required. Conservative every 5 to 6-year MRI and MRA evaluation just to ensure no new changes.     Assessment & Plan:  No AVM on angiogram-recommends Conservative every 5 to 6-year MRI and MRA evaluation just to ensure no new changes and to follow-up with neurology for exertional headaches.         Plan-9/6/2024  Patient Instructions   S/p angiogram no AVM per Dr. Escobar records reviewed recommends MRI/MRA every 5 to 6 years to monitor for any new changes     reschedule appt with Dr Gaines-Shukri Gaines M, DO -patient missed appointment  [address] [place] [address] [address]   [place] [address]   Phone: [phone]   Fax: [phone]     Also sent a referral to Dr. Gaines/neurology    Try amitriptyline 10 mg 1 tablet at night if still with headaches can take 2 tablets regularly at night  Follow-up in 2 months consider Topamax in future or other alternatives           FOLLOW UP  Return in about 2 months (around 11/6/2024) for Recheck.  Blood pressure and headaches consider Topamax if needed or alternative    Patient was given instructions and counseling regarding his condition or for health maintenance advice. Please see specific information pulled into the AVS if appropriate.       Older notes  Patient Instructions 7/24/2024   Stay on amlodipine 2.5 mg  daily  Goal BP <130/80  Follow low salt diet   Keep appointments with neurosurgeon tomorrow and neurologist Dr. Gaines  Follow-up in 6 weeks and bring blood pressure log    7/24/2024 visit  patient here for physical     Chronic tension-type headache -referred to Dr. Gaines (8/2024)  by  Dr. Paiz at [facility]     Possible AV malformation seeing Dr. Paiz-MRI scan with potential catheter angiogram in the near future     Doing Mixed martial arts past month     C/o pressure type HA now every other day now-min to hours-     HTN-130/82-at home      Patient Instructions 4/24/2024   Do CT of head with contrast and CTA of head with contrast for [place] headache  Labs today  Start amlodipine 2.5 mg daily-  Ibuprofen 600 mg with food for HA prn-SE discussed -bleeding , gastritis     Avoid any weight lifting or exercise until CT scan of the head and CTA of the head have been done   4/24/2024      patient here to establish care-     Main concern is per above he was lifting weights 2 weeks ago-and started getting headache on the left side--gets HA 1st 10 min when in gym-last episode of left frontal area HA was 3 days ago -now has had daily HA at bilateral temple area -last one was last night  lasted 30 min-3/10 then -now none at present  Not assd with loud noises or bright light-   Works at [employer] at night 5:45 pm to 4:15 am- sleeps for 8-9 hrs     2 weeks ago Was doing leg presses at 600 lbs-and HA started -start first 10 min after in gym ([employer]) going 3 days a week(WThFr)  HA lasts from 20 min-to an hour  Started  bench lifting 600 lbs 4 weeks ago (550 lbs prior)         Elev BP-- 138/71  on 10/2021-eats out 1-2x/week last month-     FH-denies CAD in parents though dad had renal failure at 17 yo-pt unclear as to dx     SH- no cig- no drugs or ETOH  Works at [employer] at night 5:45 pm to 4:15 am- sleeps for 8-9 hrs   Works out W,Th and Fri one hr

## 2024-09-12 DIAGNOSIS — I10 PRIMARY HYPERTENSION: ICD-10-CM

## 2024-09-12 DIAGNOSIS — Z00.00 ROUTINE HISTORY AND PHYSICAL EXAMINATION OF ADULT: ICD-10-CM

## 2024-09-13 RX ORDER — AMLODIPINE BESYLATE 2.5 MG/1
TABLET ORAL
Qty: 30 TABLET | Refills: 5 | Status: SHIPPED | OUTPATIENT
Start: 2024-09-13

## 2024-10-03 DIAGNOSIS — I10 PRIMARY HYPERTENSION: ICD-10-CM

## 2024-10-03 DIAGNOSIS — Z00.00 ROUTINE HISTORY AND PHYSICAL EXAMINATION OF ADULT: ICD-10-CM

## 2024-10-04 RX ORDER — AMLODIPINE BESYLATE 2.5 MG/1
2.5 TABLET ORAL DAILY
Qty: 30 TABLET | Refills: 5 | Status: SHIPPED | OUTPATIENT
Start: 2024-10-04

## 2024-11-18 DIAGNOSIS — G44.84 EXERTIONAL HEADACHE: ICD-10-CM

## 2024-11-18 RX ORDER — AMITRIPTYLINE HYDROCHLORIDE 10 MG/1
TABLET ORAL
Qty: 60 TABLET | Refills: 1 | Status: SHIPPED | OUTPATIENT
Start: 2024-11-18

## 2025-02-03 ENCOUNTER — TELEPHONE (OUTPATIENT)
Dept: INTERNAL MEDICINE | Age: 20
End: 2025-02-03
Payer: COMMERCIAL

## 2025-02-03 NOTE — TELEPHONE ENCOUNTER
Caller: Adrian Harris    Relationship: Self    Best call back number: [phone]    Who is your current provider: DR. JOHNS     Is your current provider offboarding? NO    Who would you like your new provider to be: BECKY BERRY    What are your reasons for transferring care: ALL OF HIS FAMILY SEE'S  KRISTI AND HE WOULD LIKE TO BE HER AS WELL.     Additional notes: HAS AN APPOINTMENT WITH HER ON FRIDAY FOR DISCUSSING HEADACHES.

## 2025-02-07 ENCOUNTER — OFFICE VISIT (OUTPATIENT)
Dept: INTERNAL MEDICINE | Age: 20
End: 2025-02-07
Payer: COMMERCIAL

## 2025-02-07 VITALS
TEMPERATURE: 97.5 F | OXYGEN SATURATION: 99 % | SYSTOLIC BLOOD PRESSURE: 124 MMHG | DIASTOLIC BLOOD PRESSURE: 90 MMHG | HEIGHT: 68 IN | HEART RATE: 54 BPM | WEIGHT: 166.2 LBS | BODY MASS INDEX: 25.19 KG/M2

## 2025-02-07 DIAGNOSIS — Z87.448 HISTORY OF HEMATURIA: ICD-10-CM

## 2025-02-07 DIAGNOSIS — E55.9 VITAMIN D DEFICIENCY: ICD-10-CM

## 2025-02-07 DIAGNOSIS — R10.9 FLANK PAIN: ICD-10-CM

## 2025-02-07 DIAGNOSIS — G44.229 CHRONIC TENSION-TYPE HEADACHE, NOT INTRACTABLE: ICD-10-CM

## 2025-02-07 DIAGNOSIS — R79.89 ELEVATED TSH: ICD-10-CM

## 2025-02-07 DIAGNOSIS — I10 PRIMARY HYPERTENSION: Primary | ICD-10-CM

## 2025-02-07 DIAGNOSIS — R90.89 ABNORMAL BRAIN MRI: ICD-10-CM

## 2025-02-07 DIAGNOSIS — R30.0 DYSURIA: ICD-10-CM

## 2025-02-07 DIAGNOSIS — R79.89 ABNORMAL TSH: ICD-10-CM

## 2025-02-07 LAB
25(OH)D3 SERPL-MCNC: 23.2 NG/ML (ref 30–100)
ALBUMIN SERPL-MCNC: 4.8 G/DL (ref 3.5–5.2)
ALBUMIN/GLOB SERPL: 1.7 G/DL
ALP SERPL-CCNC: 94 U/L (ref 39–117)
ALT SERPL W P-5'-P-CCNC: 28 U/L (ref 1–41)
ANION GAP SERPL CALCULATED.3IONS-SCNC: 12 MMOL/L (ref 5–15)
AST SERPL-CCNC: 44 U/L (ref 1–40)
BACTERIA UR QL AUTO: NORMAL /HPF
BASOPHILS # BLD AUTO: 0.03 10*3/MM3 (ref 0–0.2)
BASOPHILS NFR BLD AUTO: 0.5 % (ref 0–1.5)
BILIRUB BLD-MCNC: NEGATIVE MG/DL
BILIRUB SERPL-MCNC: 0.4 MG/DL (ref 0–1.2)
BILIRUB UR QL STRIP: NEGATIVE
BUN SERPL-MCNC: 11 MG/DL (ref 6–20)
BUN/CREAT SERPL: 11.1 (ref 7–25)
CALCIUM SPEC-SCNC: 9.9 MG/DL (ref 8.6–10.5)
CHLORIDE SERPL-SCNC: 103 MMOL/L (ref 98–107)
CLARITY UR: CLEAR
CLARITY, POC: CLEAR
CO2 SERPL-SCNC: 26 MMOL/L (ref 22–29)
COLOR UR: YELLOW
COLOR UR: YELLOW
CREAT SERPL-MCNC: 0.99 MG/DL (ref 0.76–1.27)
DEPRECATED RDW RBC AUTO: 40.3 FL (ref 37–54)
EGFRCR SERPLBLD CKD-EPI 2021: 111.8 ML/MIN/1.73
EOSINOPHIL # BLD AUTO: 0.12 10*3/MM3 (ref 0–0.4)
EOSINOPHIL NFR BLD AUTO: 2.1 % (ref 0.3–6.2)
ERYTHROCYTE [DISTWIDTH] IN BLOOD BY AUTOMATED COUNT: 13.2 % (ref 12.3–15.4)
EXPIRATION DATE: ABNORMAL
FOLATE SERPL-MCNC: 18.2 NG/ML (ref 4.78–24.2)
GLOBULIN UR ELPH-MCNC: 2.9 GM/DL
GLUCOSE SERPL-MCNC: 91 MG/DL (ref 65–99)
GLUCOSE UR STRIP-MCNC: NEGATIVE MG/DL
GLUCOSE UR STRIP-MCNC: NEGATIVE MG/DL
HCT VFR BLD AUTO: 45.6 % (ref 37.5–51)
HGB BLD-MCNC: 15.5 G/DL (ref 13–17.7)
HGB UR QL STRIP.AUTO: NEGATIVE
HYALINE CASTS UR QL AUTO: NORMAL /LPF
IMM GRANULOCYTES # BLD AUTO: 0.01 10*3/MM3 (ref 0–0.05)
IMM GRANULOCYTES NFR BLD AUTO: 0.2 % (ref 0–0.5)
KETONES UR QL STRIP: NEGATIVE
KETONES UR QL: NEGATIVE
LEUKOCYTE EST, POC: NEGATIVE
LEUKOCYTE ESTERASE UR QL STRIP.AUTO: NEGATIVE
LYMPHOCYTES # BLD AUTO: 2.75 10*3/MM3 (ref 0.7–3.1)
LYMPHOCYTES NFR BLD AUTO: 48.3 % (ref 19.6–45.3)
Lab: ABNORMAL
MCH RBC QN AUTO: 28.7 PG (ref 26.6–33)
MCHC RBC AUTO-ENTMCNC: 34 G/DL (ref 31.5–35.7)
MCV RBC AUTO: 84.3 FL (ref 79–97)
MONOCYTES # BLD AUTO: 0.45 10*3/MM3 (ref 0.1–0.9)
MONOCYTES NFR BLD AUTO: 7.9 % (ref 5–12)
NEUTROPHILS NFR BLD AUTO: 2.33 10*3/MM3 (ref 1.7–7)
NEUTROPHILS NFR BLD AUTO: 41 % (ref 42.7–76)
NITRITE UR QL STRIP: NEGATIVE
NITRITE UR-MCNC: NEGATIVE MG/ML
NRBC BLD AUTO-RTO: 0 /100 WBC (ref 0–0.2)
PH UR STRIP.AUTO: 5.5 [PH] (ref 5–8)
PH UR: 6 [PH] (ref 5–8)
PLATELET # BLD AUTO: 262 10*3/MM3 (ref 140–450)
PMV BLD AUTO: 11.2 FL (ref 6–12)
POTASSIUM SERPL-SCNC: 4.3 MMOL/L (ref 3.5–5.2)
PROT SERPL-MCNC: 7.7 G/DL (ref 6–8.5)
PROT UR QL STRIP: NEGATIVE
PROT UR STRIP-MCNC: NEGATIVE MG/DL
RBC # BLD AUTO: 5.41 10*6/MM3 (ref 4.14–5.8)
RBC # UR STRIP: NEGATIVE /UL
RBC # UR STRIP: NORMAL /HPF
REF LAB TEST METHOD: NORMAL
SODIUM SERPL-SCNC: 141 MMOL/L (ref 136–145)
SP GR UR STRIP: 1.02 (ref 1–1.03)
SP GR UR: 1.03 (ref 1–1.03)
SQUAMOUS #/AREA URNS HPF: NORMAL /HPF
T4 FREE SERPL-MCNC: 1.32 NG/DL (ref 0.92–1.68)
TSH SERPL DL<=0.05 MIU/L-ACNC: 11.2 UIU/ML (ref 0.27–4.2)
UROBILINOGEN UR QL STRIP: NORMAL
UROBILINOGEN UR QL: ABNORMAL
VIT B12 BLD-MCNC: 1016 PG/ML (ref 211–946)
WBC # UR STRIP: NORMAL /HPF
WBC NRBC COR # BLD AUTO: 5.69 10*3/MM3 (ref 3.4–10.8)

## 2025-02-07 PROCEDURE — 80050 GENERAL HEALTH PANEL: CPT

## 2025-02-07 PROCEDURE — 81003 URINALYSIS AUTO W/O SCOPE: CPT

## 2025-02-07 PROCEDURE — 82607 VITAMIN B-12: CPT

## 2025-02-07 PROCEDURE — 84439 ASSAY OF FREE THYROXINE: CPT

## 2025-02-07 PROCEDURE — 82746 ASSAY OF FOLIC ACID SERUM: CPT

## 2025-02-07 PROCEDURE — 81001 URINALYSIS AUTO W/SCOPE: CPT

## 2025-02-07 PROCEDURE — 82306 VITAMIN D 25 HYDROXY: CPT

## 2025-02-07 RX ORDER — SUMATRIPTAN 50 MG/1
TABLET, FILM COATED ORAL
Qty: 8 TABLET | Refills: 5 | Status: SHIPPED | OUTPATIENT
Start: 2025-02-07

## 2025-02-07 NOTE — ASSESSMENT & PLAN NOTE
Patient had MRI and MRA that showed a left cortical vasculature structure that may be related to dural AV fistula or AVM.  MRI and MRA were obtained that showed possible venous varix or vascular dilatation.  He had a diagnostic angiogram to determine the flow related properties of these findings.  There were no evidence of pathologic, high flow evidence of AVM nidus, dural AV fistula. There is a venous varix that feels exactly in phase with venous structures. Low flow anatomic variant of developmental venous anomaly.  No further angiography required. Conservative every 5 to 6-year MRI and MRA evaluation just to ensure no new changes.  Per Dr. Paiz

## 2025-02-07 NOTE — ASSESSMENT & PLAN NOTE
His blood pressure is slightly elevated today at 124/90. He is currently taking amlodipine 2.5 mg but has trouble remembering to take it regularly. He is advised to place the medication on his nightstand to help remember to take it before bed. Lifestyle modifications, such as reducing sodium intake and managing stress, are recommended to help control blood pressure.

## 2025-02-07 NOTE — PROGRESS NOTES
Chief Complaint  Headache (Pt complains of ongoing issue of headaches. States this has been an issue as long as he can remember./States he would like to review his amitryptiline dose, states it hasn't helped.) and Hypertension    History of Present Illness  SUBJECTIVE  Adrian Harris presents to [facility]   History of Present Illness  The patient is a male who presents to the office to establish care.    He has been diagnosed with hypertension and is currently on amlodipine 2.5 mg, although he admits to inconsistent adherence to this medication. His last dose was taken 2 days ago.    He experiences headaches approximately once or twice a week, each episode lasting several hours. He has not experienced a headache in the past few days. He has not previously tried Imitrex or Maxalt for his headaches. He has undergone an angiogram, which yielded normal results. He occasionally resorts to using a cold rag and taking ibuprofen, which provides relief. He has been prescribed amitriptyline 10 mg at bedtime but has discontinued its use due to lack of efficacy.    He reports two instances of hematuria, occurring a few months apart. He also experiences dysuria, a symptom persisting since he was 12 years old. He has not previously undergone a urinalysis. He does not report any associated flank pain, nausea, or vomiting. He reports lower back pain, localized slightly towards the left. He does not experience difficulty in bladder emptying or increased urinary frequency.    SOCIAL HISTORY  He does not smoke. He drinks alcohol from time to time. He exercises regularly.    MEDICATIONS  Current: Amlodipine 2.5 mg, amitriptyline 10 mg, ibuprofen as needed.  S/p angiogram no AVM per Dr. Escobar records reviewed recommends MRI/MRA every 5 to 6 years to monitor for any new changes        Past Medical History:   Diagnosis Date    Headache       Family History   Problem Relation Age of Onset    Tremor  "Mother     Kidney disease Father       Past Surgical History:   Procedure Laterality Date    TONSILLECTOMY          Current Outpatient Medications:     amLODIPine (NORVASC) 2.5 MG tablet, Take 1 tablet by mouth Daily., Disp: 30 tablet, Rfl: 5    ibuprofen (ADVIL,MOTRIN) 600 MG tablet, Take 1 tablet by mouth Every 8 (Eight) Hours As Needed for Mild Pain. With food, Disp: 360 tablet, Rfl: 0    SUMAtriptan (Imitrex) 50 MG tablet, Take one tablet at onset of headache. May repeat dose one time in 2 hours if headache not relieved., Disp: 8 tablet, Rfl: 5    OBJECTIVE  Vital Signs:   /90 (BP Location: Right arm, Patient Position: Sitting, Cuff Size: Adult)   Pulse 54   Temp 97.5 °F (36.4 °C) (Skin)   Ht 172.7 cm (68\")   Wt 75.4 kg (166 lb 3.2 oz)   SpO2 99%   BMI 25.27 kg/m²    Estimated body mass index is 25.27 kg/m² as calculated from the following:    Height as of this encounter: 172.7 cm (68\").    Weight as of this encounter: 75.4 kg (166 lb 3.2 oz).     Wt Readings from Last 3 Encounters:   02/07/25 75.4 kg (166 lb 3.2 oz)   09/06/24 81.2 kg (179 lb)   07/24/24 76.4 kg (168 lb 6.4 oz)     BP Readings from Last 3 Encounters:   02/07/25 124/90   09/06/24 128/80   07/24/24 140/80       Physical Exam  Vitals and nursing note reviewed.   Constitutional:       Appearance: Normal appearance.   HENT:      Head: Normocephalic.   Eyes:      Extraocular Movements: Extraocular movements intact.      Conjunctiva/sclera: Conjunctivae normal.   Cardiovascular:      Rate and Rhythm: Regular rhythm. Bradycardia present.      Heart sounds: Normal heart sounds. No murmur heard.  Pulmonary:      Effort: Pulmonary effort is normal.      Breath sounds: Normal breath sounds. No wheezing or rales.   Abdominal:      General: Bowel sounds are normal.      Palpations: Abdomen is soft.      Tenderness: There is no abdominal tenderness. There is no guarding.   Musculoskeletal:         General: No swelling. Normal range of motion. "   Skin:     General: Skin is warm and dry.   Neurological:      General: No focal deficit present.      Mental Status: He is alert and oriented to person, place, and time. Mental status is at baseline.   Psychiatric:         Mood and Affect: Mood normal.         Behavior: Behavior normal.         Thought Content: Thought content normal.         Judgment: Judgment normal.          Result Review        No Images in the past 120 days found..     The above data has been reviewed by NIDHI Peña 02/07/2025 11:10 EST.          Patient Care Team:  Rosaura Early APRN as PCP - General (Internal Medicine)            ASSESSMENT & PLAN    Diagnoses and all orders for this visit:    1. Primary hypertension (Primary)  Assessment & Plan:  His blood pressure is slightly elevated today at 124/90. He is currently taking amlodipine 2.5 mg but has trouble remembering to take it regularly. He is advised to place the medication on his nightstand to help remember to take it before bed. Lifestyle modifications, such as reducing sodium intake and managing stress, are recommended to help control blood pressure.    Orders:  -     CBC & Differential  -     Comprehensive Metabolic Panel  -     Vitamin B12 & Folate  -     TSH+Free T4    2. Chronic tension-type headache, not intractable  Assessment & Plan:  7/month-ibuprofen helps            Orders:  -     SUMAtriptan (Imitrex) 50 MG tablet; Take one tablet at onset of headache. May repeat dose one time in 2 hours if headache not relieved.  Dispense: 8 tablet; Refill: 5    3. Abnormal brain MRI  Assessment & Plan:  Patient had MRI and MRA that showed a left cortical vasculature structure that may be related to dural AV fistula or AVM.  MRI and MRA were obtained that showed possible venous varix or vascular dilatation.  He had a diagnostic angiogram to determine the flow related properties of these findings.  There were no evidence of pathologic, high flow evidence of AVM nidus, dural AV  fistula. There is a venous varix that feels exactly in phase with venous structures. Low flow anatomic variant of developmental venous anomaly.  No further angiography required. Conservative every 5 to 6-year MRI and MRA evaluation just to ensure no new changes.  Per Dr. Paiz      4. Vitamin D deficiency  -     Vitamin D,25-Hydroxy    5. Dysuria  -     Urinalysis With Microscopic - Urine, Clean Catch; Future  -     XR Abdomen KUB; Future  -     Urinalysis With Microscopic - Urine, Clean Catch    6. Abnormal TSH  -     TSH+Free T4    7. Flank pain  -     XR Abdomen KUB; Future    8. History of hematuria  -     Urinalysis With Microscopic - Urine, Clean Catch; Future  -     XR Abdomen KUB; Future  -     Urinalysis With Microscopic - Urine, Clean Catch         Assessment & Plan  1. Hypertension.  His blood pressure is slightly elevated today at 124/90. He is currently taking amlodipine 2.5 mg but has trouble remembering to take it regularly. He is advised to place the medication on his nightstand to help remember to take it before bed. Lifestyle modifications, such as reducing sodium intake and managing stress, are recommended to help control blood pressure.    2. Headaches.  He experiences headaches approximately seven times a month, lasting a couple of hours. He has been prescribed amitriptyline 10 mg at bedtime but has not been taking it regularly as it did not help. A prescription for Imitrex will be provided to take at the onset of a migraine. If Imitrex is ineffective, alternative medications will be considered.    3. Hematuria.  He reported noticing red urine on two occasions a few months apart. He also experiences burning during urination, which has been ongoing for years. A urine sample will be collected today to check for blood and perform STD testing. A KUB x-ray will be ordered to rule out the presence of kidney stones.    4. Abnormal MRI.  Patient had MRI and MRA that showed a left cortical vasculature  structure that may be related to dural AV fistula or AVM.  MRI and MRA were obtained that showed possible venous varix or vascular dilatation.  He had a diagnostic angiogram to determine the flow related properties of these findings.  There were no evidence of pathologic, high flow evidence of AVM nidus, dural AV fistula. There is a venous varix that feels exactly in phase with venous structures. Low flow anatomic variant of developmental venous anomaly.  No further angiography required. Conservative every 5 to 6-year MRI and MRA evaluation just to ensure no new changes.  Per Dr. Paiz    5. Vitamin D deficiency.  He had vitamin D deficiency noted in his last blood work from last year. A fasting blood work will be ordered to recheck his vitamin D levels.    6. Thyroid dysfunction.  His thyroid function was slightly abnormal in the last blood work. A fasting blood work will be ordered to recheck his thyroid function.    Follow-up  The patient will follow up in 6 months or sooner if needed.    PROCEDURE  The patient has undergone an angiogram in the past, which yielded normal results.      Tobacco Use: Low Risk  (2/7/2025)    Patient History     Smoking Tobacco Use: Never     Smokeless Tobacco Use: Never     Passive Exposure: Never       Follow Up     Return in about 6 months (around 8/7/2025) for Annual physical.    Please note that portions of this note were completed with a voice recognition program.    Patient was given instructions and counseling regarding his condition or for health maintenance advice. Please see specific information pulled into the AVS if appropriate.   I have reviewed information obtained and documented by others and I have confirmed the accuracy of this documented note.    NIDHI Peña    Patient or patient representative verbalized consent for the use of Ambient Listening during the visit with  NIDHI Peña for chart documentation. 2/7/2025  11:56 EST

## 2025-02-10 ENCOUNTER — TELEPHONE (OUTPATIENT)
Dept: INTERNAL MEDICINE | Age: 20
End: 2025-02-10
Payer: COMMERCIAL

## 2025-02-10 RX ORDER — ERGOCALCIFEROL 1.25 MG/1
50000 CAPSULE, LIQUID FILLED ORAL WEEKLY
Qty: 12 CAPSULE | Refills: 1 | Status: SHIPPED | OUTPATIENT
Start: 2025-02-10

## 2025-02-10 NOTE — TELEPHONE ENCOUNTER
HUB may relay message to patient.     ----- Message from Rosaura Early sent at 2/10/2025  1:02 PM EST -----  Vitamin D is low.  I will send patient in vitamin D supplementation 50,000 units once a week.  His TSH is pretty elevated.  I have added on a couple additional labs if he could come in and get those done at his convenience.  Thank you

## 2025-02-10 NOTE — TELEPHONE ENCOUNTER
Name: Adrian Harris    Relationship: Self    HUB PROVIDED THE RELAY MESSAGE FROM THE OFFICE   PATIENT VOICED UNDERSTANDING AND HAS NO FURTHER QUESTIONS AT THIS TIME

## 2025-02-17 ENCOUNTER — CLINICAL SUPPORT (OUTPATIENT)
Dept: INTERNAL MEDICINE | Age: 20
End: 2025-02-17
Payer: COMMERCIAL

## 2025-02-17 DIAGNOSIS — R79.89 ELEVATED TSH: ICD-10-CM

## 2025-02-17 LAB — T3FREE SERPL-MCNC: 3.58 PG/ML (ref 2–4.4)

## 2025-02-17 PROCEDURE — 86376 MICROSOMAL ANTIBODY EACH: CPT

## 2025-02-17 PROCEDURE — 84481 FREE ASSAY (FT-3): CPT

## 2025-02-17 PROCEDURE — 36415 COLL VENOUS BLD VENIPUNCTURE: CPT

## 2025-02-18 ENCOUNTER — TELEPHONE (OUTPATIENT)
Dept: INTERNAL MEDICINE | Age: 20
End: 2025-02-18
Payer: COMMERCIAL

## 2025-02-18 LAB — THYROPEROXIDASE AB SERPL-ACNC: 114 IU/ML (ref 0–34)

## 2025-02-18 NOTE — TELEPHONE ENCOUNTER
HUB may relay message to patient.     ----- Message from Rosaura Early sent at 2/18/2025  9:26 AM EST -----  Please call patient and let him know that his thyroid antibodies are elevated as is his TSH.  I am going to get a thyroid ultrasound and I am also going to set him up with endocrinology.  I have already placed a referral order.  They will call him ab  out the ultrasound.  Thank you

## 2025-02-18 NOTE — TELEPHONE ENCOUNTER
Name: Adrian Harris    Relationship: Self    Best Callback Number: [phone]     HUB PROVIDED THE RELAY MESSAGE FROM THE OFFICE   PATIENT VOICED UNDERSTANDING AND HAS NO FURTHER QUESTIONS AT THIS TIME

## 2025-02-18 NOTE — PROGRESS NOTES
Please call patient and let him know that his thyroid antibodies are elevated as is his TSH.  I am going to get a thyroid ultrasound and I am also going to set him up with endocrinology.  I have already placed a referral order.  They will call him about the ultrasound.  Thank you

## 2025-02-19 DIAGNOSIS — G44.84 EXERTIONAL HEADACHE: ICD-10-CM

## 2025-02-19 RX ORDER — AMITRIPTYLINE HYDROCHLORIDE 10 MG/1
TABLET ORAL
Qty: 60 TABLET | Refills: 1 | OUTPATIENT
Start: 2025-02-19

## 2025-03-05 DIAGNOSIS — G44.52 NEW DAILY PERSISTENT HEADACHE: ICD-10-CM

## 2025-03-05 DIAGNOSIS — I10 PRIMARY HYPERTENSION: ICD-10-CM

## 2025-03-06 ENCOUNTER — OFFICE VISIT (OUTPATIENT)
Dept: ENDOCRINOLOGY | Age: 20
End: 2025-03-06
Payer: COMMERCIAL

## 2025-03-06 VITALS
DIASTOLIC BLOOD PRESSURE: 78 MMHG | BODY MASS INDEX: 25.28 KG/M2 | HEART RATE: 54 BPM | OXYGEN SATURATION: 98 % | WEIGHT: 166.8 LBS | SYSTOLIC BLOOD PRESSURE: 138 MMHG | HEIGHT: 68 IN

## 2025-03-06 DIAGNOSIS — R94.6 ABNORMAL THYROID FUNCTION TEST: Primary | ICD-10-CM

## 2025-03-06 RX ORDER — IBUPROFEN 600 MG/1
600 TABLET, FILM COATED ORAL EVERY 8 HOURS PRN
Qty: 360 TABLET | Refills: 0 | Status: SHIPPED | OUTPATIENT
Start: 2025-03-06

## 2025-03-06 RX ORDER — LEVOTHYROXINE SODIUM 25 UG/1
25 TABLET ORAL
Qty: 30 TABLET | Refills: 1 | Status: SHIPPED | OUTPATIENT
Start: 2025-03-06

## 2025-03-06 NOTE — PROGRESS NOTES
Chief complaint   No chief complaint on file.         Subjective     History of Present Illness:      This is a 19 years old male I am seeing for thyroid disease   referred by PCP     other medical issues   1- HTN  2- Tension headaches   3- Other   Pt was Dx with abnormal thyroid levels   Is not on thyroid medication now or in the past   had blood work done by PCP in 2-2025: TSH was 11.2, t4 free was 1.3, TPO +      pt states that he is feeling fine and there is no history head and Neck radiation, no History of thyroid surgery, no family history of Thyroid disease, No prior history of Thyroid Dysfunction,  and has No dysphagia, No dyspnea, No dysphonia, No change size of neck, No neck pain or discomfort, No nervousness, No shakiness, No palpitations  Weight stable , BM daily, No diarrhea, No constipation  No edema, No proximal muscle weak  Works for [employer]        Latest Reference Range & Units 02/07/25 12:02 02/17/25 11:49   TSH Baseline 0.270 - 4.200 uIU/mL 11.200 (H)    Free T4 0.92 - 1.68 ng/dL 1.32    T3, Free 2.00 - 4.40 pg/mL  3.58   Thyroid Peroxidase Antibody 0 - 34 IU/mL  114 (H)   (H): Data is abnormally high  Family History   Problem Relation Age of Onset    Tremor Mother     Kidney disease Father      Social History     Socioeconomic History    Marital status: Single   Tobacco Use    Smoking status: Never     Passive exposure: Never    Smokeless tobacco: Never   Vaping Use    Vaping status: Never Used   Substance and Sexual Activity    Alcohol use: Never    Sexual activity: Defer     Past Medical History:   Diagnosis Date    Headache      Past Surgical History:   Procedure Laterality Date    TONSILLECTOMY         Current Outpatient Medications:     amLODIPine (NORVASC) 2.5 MG tablet, Take 1 tablet by mouth Daily., Disp: 30 tablet, Rfl: 5    ibuprofen (ADVIL,MOTRIN) 600 MG tablet, Take 1 tablet by mouth Every 8 (Eight) Hours As Needed for Mild Pain. With food, Disp: 360 tablet, Rfl: 0    SUMAtriptan  (Imitrex) 50 MG tablet, Take one tablet at onset of headache. May repeat dose one time in 2 hours if headache not relieved., Disp: 8 tablet, Rfl: 5    vitamin D (ERGOCALCIFEROL) 1.25 MG (66542 UT) capsule capsule, Take 1 capsule by mouth 1 (One) Time Per Week., Disp: 12 capsule, Rfl: 1  Patient has no known allergies.    Objective   There were no vitals filed for this visit.       Physical Exam  Neurological:      General: No focal deficit present.      Mental Status: He is alert and oriented to person, place, and time.             There are no diagnoses linked to this encounter.     Assessment:     This is a 19 years old male I am seeing for thyroid disease, referred by PCP     Is not on thyroid medication now or in the past   Pt was Dx with abnormal thyroid levels after he had blood work done by PCP in 2-2025: TSH was 11.2, t4 free was 1.3, TPO +    This makes the Dx of Hypothyroidism due to Hashimoto disease    had a long discussion about thyroid and we talked about avoiding low TSH as that can cause heart and bone disease and that not all sx can be related to thyroid.         Plan:      1. Start Levothyroxine 25 mcg daily   2. Labs before the next visit :TSH, Free T4      3. Diet and exercise and low carb meals   4. Return in 2 months   5. Was informed that he has hashimoto disease   6. Labs reviewed with the Patient : TSH   7- I reviewed the notes from PCP   8- Needs to go back on PCP for all of the other sx he is having like  HTN   9- Instructions for taking levothyroxine   Brand name is preferred   Take thyroid pill all by itself   Take thyroid pill one hour before food or 2 to 3 hours after food   Heat, humidity, and direct sunlight will cause a loss of potency .      I discussed with the patient/legal representative the risks and the benefits associated with the medications.  The patient/legal representative has been given the opportunity to ask questions.  Alternatives to the proposed treatment (s) were  discussed, including the likely results of no treatment.  The patient/legal representative wishes to proceed.       Vito Weiss MD   03/06/25  12:02 EST

## 2025-03-11 ENCOUNTER — HOSPITAL ENCOUNTER (OUTPATIENT)
Dept: ULTRASOUND IMAGING | Facility: HOSPITAL | Age: 20
Discharge: HOME | End: 2025-03-11
Payer: COMMERCIAL

## 2025-03-11 PROCEDURE — 76536 US EXAM OF HEAD AND NECK: CPT

## 2025-04-03 DIAGNOSIS — G44.52 NEW DAILY PERSISTENT HEADACHE: ICD-10-CM

## 2025-04-03 DIAGNOSIS — I10 PRIMARY HYPERTENSION: ICD-10-CM

## 2025-04-03 DIAGNOSIS — G44.229 CHRONIC TENSION-TYPE HEADACHE, NOT INTRACTABLE: ICD-10-CM

## 2025-04-03 DIAGNOSIS — Z00.00 ROUTINE HISTORY AND PHYSICAL EXAMINATION OF ADULT: ICD-10-CM

## 2025-04-03 DIAGNOSIS — R94.6 ABNORMAL THYROID FUNCTION TEST: ICD-10-CM

## 2025-04-03 RX ORDER — IBUPROFEN 600 MG/1
600 TABLET, FILM COATED ORAL EVERY 8 HOURS PRN
Qty: 360 TABLET | Refills: 0 | Status: SHIPPED | OUTPATIENT
Start: 2025-04-03

## 2025-04-03 RX ORDER — AMLODIPINE BESYLATE 2.5 MG/1
2.5 TABLET ORAL DAILY
Qty: 30 TABLET | Refills: 5 | Status: SHIPPED | OUTPATIENT
Start: 2025-04-03

## 2025-04-03 RX ORDER — LEVOTHYROXINE SODIUM 25 UG/1
25 TABLET ORAL
Qty: 30 TABLET | Refills: 0 | Status: SHIPPED | OUTPATIENT
Start: 2025-04-03

## 2025-04-03 RX ORDER — ERGOCALCIFEROL 1.25 MG/1
50000 CAPSULE, LIQUID FILLED ORAL WEEKLY
Qty: 12 CAPSULE | Refills: 1 | Status: SHIPPED | OUTPATIENT
Start: 2025-04-03

## 2025-04-03 RX ORDER — SUMATRIPTAN 50 MG/1
TABLET, FILM COATED ORAL
Qty: 8 TABLET | Refills: 5 | Status: SHIPPED | OUTPATIENT
Start: 2025-04-03

## 2025-04-03 NOTE — TELEPHONE ENCOUNTER
Rx Refill Note  Requested Prescriptions     Pending Prescriptions Disp Refills    levothyroxine (SYNTHROID, LEVOTHROID) 25 MCG tablet 30 tablet 1     Sig: Take 1 tablet by mouth Every Morning.      Last office visit with prescribing clinician: 3/6/2025   Last telemedicine visit with prescribing clinician: Visit date not found   Next office visit with prescribing clinician: 7/9/2025                         Would you like a call back once the refill request has been completed: [] Yes [] No    If the office needs to give you a call back, can they leave a voicemail: [] Yes [] No    Fabiana Moya MA  04/03/25, 08:11 EDT

## 2025-05-19 DIAGNOSIS — R94.6 ABNORMAL THYROID FUNCTION TEST: Primary | ICD-10-CM

## 2025-06-03 DIAGNOSIS — R94.6 ABNORMAL THYROID FUNCTION TEST: ICD-10-CM

## 2025-06-03 RX ORDER — LEVOTHYROXINE SODIUM 25 UG/1
25 TABLET ORAL EVERY MORNING
Qty: 30 TABLET | Refills: 1 | Status: SHIPPED | OUTPATIENT
Start: 2025-06-03

## 2025-06-03 NOTE — TELEPHONE ENCOUNTER
Rx Refill Note  Requested Prescriptions     Pending Prescriptions Disp Refills    levothyroxine (SYNTHROID, LEVOTHROID) 25 MCG tablet [Pharmacy Med Name: LEVOTHYROXINE 25 MCG TABLET] 30 tablet 0     Sig: TAKE 1 TABLET BY MOUTH EVERY DAY IN THE MORNING      Last office visit with prescribing clinician: 3/6/2025   Last telemedicine visit with prescribing clinician: Visit date not found   Next office visit with prescribing clinician: 7/24/2025                         Would you like a call back once the refill request has been completed: [] Yes [] No    If the office needs to give you a call back, can they leave a voicemail: [] Yes [] No    Fabiana Moya MA  06/03/25, 07:57 EDT

## 2025-07-17 ENCOUNTER — TELEPHONE (OUTPATIENT)
Dept: ENDOCRINOLOGY | Age: 20
End: 2025-07-17

## 2025-07-23 DIAGNOSIS — R94.6 ABNORMAL THYROID FUNCTION TEST: ICD-10-CM

## 2025-07-24 RX ORDER — LEVOTHYROXINE SODIUM 25 UG/1
25 TABLET ORAL EVERY MORNING
Qty: 30 TABLET | Refills: 1 | OUTPATIENT
Start: 2025-07-24

## 2025-08-15 ENCOUNTER — CLINICAL SUPPORT (OUTPATIENT)
Dept: INTERNAL MEDICINE | Age: 20
End: 2025-08-15
Payer: COMMERCIAL

## 2025-08-15 DIAGNOSIS — R94.6 ABNORMAL THYROID FUNCTION TEST: ICD-10-CM

## 2025-08-15 LAB
T4 FREE SERPL-MCNC: 1.48 NG/DL (ref 0.92–1.68)
TSH SERPL DL<=0.05 MIU/L-ACNC: 4.81 UIU/ML (ref 0.27–4.2)

## 2025-08-15 PROCEDURE — 36415 COLL VENOUS BLD VENIPUNCTURE: CPT

## 2025-08-15 PROCEDURE — 86376 MICROSOMAL ANTIBODY EACH: CPT

## 2025-08-15 PROCEDURE — 84439 ASSAY OF FREE THYROXINE: CPT

## 2025-08-15 PROCEDURE — 84443 ASSAY THYROID STIM HORMONE: CPT

## 2025-08-16 LAB — THYROPEROXIDASE AB SERPL-ACNC: 42 IU/ML (ref 0–34)

## 2025-08-24 ENCOUNTER — HOSPITAL ENCOUNTER (EMERGENCY)
Facility: HOSPITAL | Age: 20
Discharge: HOME | End: 2025-08-24
Attending: EMERGENCY MEDICINE | Admitting: EMERGENCY MEDICINE
Payer: COMMERCIAL

## 2025-08-24 ENCOUNTER — APPOINTMENT (OUTPATIENT)
Dept: GENERAL RADIOLOGY | Facility: HOSPITAL | Age: 20
End: 2025-08-24
Payer: COMMERCIAL

## 2025-08-24 VITALS
TEMPERATURE: 98.4 F | OXYGEN SATURATION: 97 % | DIASTOLIC BLOOD PRESSURE: 82 MMHG | SYSTOLIC BLOOD PRESSURE: 133 MMHG | HEIGHT: 68 IN | RESPIRATION RATE: 18 BRPM | BODY MASS INDEX: 26.8 KG/M2 | WEIGHT: 176.81 LBS | HEART RATE: 48 BPM

## 2025-08-24 DIAGNOSIS — R07.89 ATYPICAL CHEST PAIN: Primary | ICD-10-CM

## 2025-08-24 LAB
ALBUMIN SERPL-MCNC: 4.9 G/DL (ref 3.5–5.2)
ALBUMIN/GLOB SERPL: 1.8 G/DL
ALP SERPL-CCNC: 101 U/L (ref 39–117)
ALT SERPL W P-5'-P-CCNC: 25 U/L (ref 1–41)
ANION GAP SERPL CALCULATED.3IONS-SCNC: 10.8 MMOL/L (ref 5–15)
AST SERPL-CCNC: 23 U/L (ref 1–40)
BASOPHILS # BLD AUTO: 0.03 10*3/MM3 (ref 0–0.2)
BASOPHILS NFR BLD AUTO: 0.4 % (ref 0–1.5)
BILIRUB SERPL-MCNC: 0.3 MG/DL (ref 0–1.2)
BUN SERPL-MCNC: 13.3 MG/DL (ref 6–20)
BUN/CREAT SERPL: 12.3 (ref 7–25)
CALCIUM SPEC-SCNC: 9.8 MG/DL (ref 8.6–10.5)
CHLORIDE SERPL-SCNC: 103 MMOL/L (ref 98–107)
CO2 SERPL-SCNC: 26.2 MMOL/L (ref 22–29)
CREAT SERPL-MCNC: 1.08 MG/DL (ref 0.76–1.27)
DEPRECATED RDW RBC AUTO: 39.1 FL (ref 37–54)
EGFRCR SERPLBLD CKD-EPI 2021: 100.1 ML/MIN/1.73
EOSINOPHIL # BLD AUTO: 0.18 10*3/MM3 (ref 0–0.4)
EOSINOPHIL NFR BLD AUTO: 2.6 % (ref 0.3–6.2)
ERYTHROCYTE [DISTWIDTH] IN BLOOD BY AUTOMATED COUNT: 12.8 % (ref 12.3–15.4)
GEN 5 1HR TROPONIN T REFLEX: <6 NG/L
GLOBULIN UR ELPH-MCNC: 2.8 GM/DL
GLUCOSE SERPL-MCNC: 97 MG/DL (ref 65–99)
HCT VFR BLD AUTO: 43.8 % (ref 37.5–51)
HGB BLD-MCNC: 15 G/DL (ref 13–17.7)
HOLD SPECIMEN: NORMAL
HOLD SPECIMEN: NORMAL
IMM GRANULOCYTES # BLD AUTO: 0.02 10*3/MM3 (ref 0–0.05)
IMM GRANULOCYTES NFR BLD AUTO: 0.3 % (ref 0–0.5)
LIPASE SERPL-CCNC: 16 U/L (ref 13–60)
LYMPHOCYTES # BLD AUTO: 3.17 10*3/MM3 (ref 0.7–3.1)
LYMPHOCYTES NFR BLD AUTO: 46.3 % (ref 19.6–45.3)
MAGNESIUM SERPL-MCNC: 2 MG/DL (ref 1.6–2.6)
MCH RBC QN AUTO: 28.7 PG (ref 26.6–33)
MCHC RBC AUTO-ENTMCNC: 34.2 G/DL (ref 31.5–35.7)
MCV RBC AUTO: 83.7 FL (ref 79–97)
MONOCYTES # BLD AUTO: 0.37 10*3/MM3 (ref 0.1–0.9)
MONOCYTES NFR BLD AUTO: 5.4 % (ref 5–12)
NEUTROPHILS NFR BLD AUTO: 3.08 10*3/MM3 (ref 1.7–7)
NEUTROPHILS NFR BLD AUTO: 45 % (ref 42.7–76)
NRBC BLD AUTO-RTO: 0 /100 WBC (ref 0–0.2)
NT-PROBNP SERPL-MCNC: <36 PG/ML (ref 0–450)
PLATELET # BLD AUTO: 248 10*3/MM3 (ref 140–450)
PMV BLD AUTO: 11.1 FL (ref 6–12)
POTASSIUM SERPL-SCNC: 4.1 MMOL/L (ref 3.5–5.2)
PROT SERPL-MCNC: 7.7 G/DL (ref 6–8.5)
QT INTERVAL: 430 MS
QTC INTERVAL: 385 MS
RBC # BLD AUTO: 5.23 10*6/MM3 (ref 4.14–5.8)
SODIUM SERPL-SCNC: 140 MMOL/L (ref 136–145)
TROPONIN T NUMERIC DELTA: NORMAL
TROPONIN T SERPL HS-MCNC: <6 NG/L
WBC NRBC COR # BLD AUTO: 6.85 10*3/MM3 (ref 3.4–10.8)
WHOLE BLOOD HOLD COAG: NORMAL
WHOLE BLOOD HOLD SPECIMEN: NORMAL

## 2025-08-24 PROCEDURE — 93005 ELECTROCARDIOGRAM TRACING: CPT | Performed by: EMERGENCY MEDICINE

## 2025-08-24 PROCEDURE — 84484 ASSAY OF TROPONIN QUANT: CPT

## 2025-08-24 PROCEDURE — 83690 ASSAY OF LIPASE: CPT

## 2025-08-24 PROCEDURE — 71045 X-RAY EXAM CHEST 1 VIEW: CPT

## 2025-08-24 PROCEDURE — 85025 COMPLETE CBC W/AUTO DIFF WBC: CPT

## 2025-08-24 PROCEDURE — 83880 ASSAY OF NATRIURETIC PEPTIDE: CPT

## 2025-08-24 PROCEDURE — 80053 COMPREHEN METABOLIC PANEL: CPT

## 2025-08-24 PROCEDURE — 36415 COLL VENOUS BLD VENIPUNCTURE: CPT

## 2025-08-24 PROCEDURE — 83735 ASSAY OF MAGNESIUM: CPT

## 2025-08-24 PROCEDURE — 84484 ASSAY OF TROPONIN QUANT: CPT | Performed by: EMERGENCY MEDICINE

## 2025-08-24 PROCEDURE — 93005 ELECTROCARDIOGRAM TRACING: CPT

## 2025-08-24 PROCEDURE — 99284 EMERGENCY DEPT VISIT MOD MDM: CPT

## 2025-08-24 RX ORDER — ASPIRIN 81 MG/1
324 TABLET, CHEWABLE ORAL ONCE
Status: DISCONTINUED | OUTPATIENT
Start: 2025-08-24 | End: 2025-08-24 | Stop reason: HOSPADM

## 2025-08-24 RX ORDER — SODIUM CHLORIDE 0.9 % (FLUSH) 0.9 %
10 SYRINGE (ML) INJECTION AS NEEDED
Status: DISCONTINUED | OUTPATIENT
Start: 2025-08-24 | End: 2025-08-24 | Stop reason: HOSPADM